# Patient Record
Sex: FEMALE | Race: WHITE | NOT HISPANIC OR LATINO | Employment: STUDENT | URBAN - METROPOLITAN AREA
[De-identification: names, ages, dates, MRNs, and addresses within clinical notes are randomized per-mention and may not be internally consistent; named-entity substitution may affect disease eponyms.]

---

## 2020-01-01 ENCOUNTER — IMMUNIZATION (OUTPATIENT)
Dept: VACCINE CLINIC | Facility: HOSPITAL | Age: 20
End: 2020-01-01

## 2020-01-01 PROCEDURE — 0011A: CPT | Performed by: OBSTETRICS & GYNECOLOGY

## 2020-01-01 PROCEDURE — 91301 HC SARSCO02 VAC 100MCG/0.5ML IM: CPT | Performed by: OBSTETRICS & GYNECOLOGY

## 2020-01-07 ENCOUNTER — APPOINTMENT (OUTPATIENT)
Dept: GENERAL RADIOLOGY | Facility: HOSPITAL | Age: 20
End: 2020-01-07

## 2020-01-07 ENCOUNTER — HOSPITAL ENCOUNTER (OUTPATIENT)
Facility: HOSPITAL | Age: 20
Setting detail: OBSERVATION
Discharge: HOME | End: 2020-01-08
Attending: EMERGENCY MEDICINE | Admitting: HOSPITALIST

## 2020-01-07 ENCOUNTER — APPOINTMENT (OUTPATIENT)
Dept: CT IMAGING | Facility: HOSPITAL | Age: 20
End: 2020-01-07

## 2020-01-07 DIAGNOSIS — J10.1 INFLUENZA DUE TO INFLUENZA VIRUS, TYPE B: ICD-10-CM

## 2020-01-07 DIAGNOSIS — R65.20 SEPSIS WITH ENCEPHALOPATHY WITHOUT SEPTIC SHOCK, DUE TO UNSPECIFIED ORGANISM (HCC): ICD-10-CM

## 2020-01-07 DIAGNOSIS — E86.0 DEHYDRATION: ICD-10-CM

## 2020-01-07 DIAGNOSIS — G93.40 SEPSIS WITH ENCEPHALOPATHY WITHOUT SEPTIC SHOCK, DUE TO UNSPECIFIED ORGANISM (HCC): ICD-10-CM

## 2020-01-07 DIAGNOSIS — J18.9 PNEUMONIA OF RIGHT LOWER LOBE DUE TO INFECTIOUS ORGANISM: Primary | ICD-10-CM

## 2020-01-07 DIAGNOSIS — A08.4 VIRAL GASTROENTERITIS: ICD-10-CM

## 2020-01-07 DIAGNOSIS — A41.9 SEPSIS WITH ENCEPHALOPATHY WITHOUT SEPTIC SHOCK, DUE TO UNSPECIFIED ORGANISM (HCC): ICD-10-CM

## 2020-01-07 LAB
ALBUMIN SERPL-MCNC: 4.2 G/DL (ref 3.5–5.2)
ALBUMIN/GLOB SERPL: 1.2 G/DL
ALP SERPL-CCNC: 98 U/L (ref 39–117)
ALT SERPL W P-5'-P-CCNC: 28 U/L (ref 1–33)
ANION GAP SERPL CALCULATED.3IONS-SCNC: 20.2 MMOL/L (ref 5–15)
AST SERPL-CCNC: 27 U/L (ref 1–32)
B PARAPERT DNA SPEC QL NAA+PROBE: NOT DETECTED
B PERT DNA SPEC QL NAA+PROBE: NOT DETECTED
BACTERIA UR QL AUTO: ABNORMAL /HPF
BASOPHILS # BLD AUTO: 0.02 10*3/MM3 (ref 0–0.2)
BASOPHILS NFR BLD AUTO: 0.1 % (ref 0–1.5)
BILIRUB SERPL-MCNC: 0.4 MG/DL (ref 0.2–1.2)
BILIRUB UR QL STRIP: ABNORMAL
BUN BLD-MCNC: 10 MG/DL (ref 6–20)
BUN/CREAT SERPL: 22.2 (ref 7–25)
C PNEUM DNA NPH QL NAA+NON-PROBE: NOT DETECTED
CALCIUM SPEC-SCNC: 8.9 MG/DL (ref 8.6–10.5)
CHLORIDE SERPL-SCNC: 93 MMOL/L (ref 98–107)
CLARITY UR: CLEAR
CO2 SERPL-SCNC: 20.8 MMOL/L (ref 22–29)
COLOR UR: YELLOW
CREAT BLD-MCNC: 0.45 MG/DL (ref 0.57–1)
D-LACTATE SERPL-SCNC: 0.8 MMOL/L (ref 0.5–2)
D-LACTATE SERPL-SCNC: 2.1 MMOL/L (ref 0.5–2)
DEPRECATED RDW RBC AUTO: 37.7 FL (ref 37–54)
EOSINOPHIL # BLD AUTO: 0 10*3/MM3 (ref 0–0.4)
EOSINOPHIL NFR BLD AUTO: 0 % (ref 0.3–6.2)
ERYTHROCYTE [DISTWIDTH] IN BLOOD BY AUTOMATED COUNT: 12.1 % (ref 12.3–15.4)
FLUAV AG NPH QL: NEGATIVE
FLUAV H1 2009 PAND RNA NPH QL NAA+PROBE: NOT DETECTED
FLUAV H1 HA GENE NPH QL NAA+PROBE: NOT DETECTED
FLUAV H3 RNA NPH QL NAA+PROBE: NOT DETECTED
FLUAV SUBTYP SPEC NAA+PROBE: NOT DETECTED
FLUBV AG NPH QL IA: POSITIVE
FLUBV RNA ISLT QL NAA+PROBE: DETECTED
GFR SERPL CREATININE-BSD FRML MDRD: >150 ML/MIN/1.73
GLOBULIN UR ELPH-MCNC: 3.6 GM/DL
GLUCOSE BLD-MCNC: 146 MG/DL (ref 65–99)
GLUCOSE UR STRIP-MCNC: NEGATIVE MG/DL
HADV DNA SPEC NAA+PROBE: NOT DETECTED
HCG SERPL QL: NEGATIVE
HCOV 229E RNA SPEC QL NAA+PROBE: NOT DETECTED
HCOV HKU1 RNA SPEC QL NAA+PROBE: NOT DETECTED
HCOV NL63 RNA SPEC QL NAA+PROBE: NOT DETECTED
HCOV OC43 RNA SPEC QL NAA+PROBE: NOT DETECTED
HCT VFR BLD AUTO: 38.3 % (ref 34–46.6)
HETEROPH AB SER QL LA: NEGATIVE
HGB BLD-MCNC: 13 G/DL (ref 12–15.9)
HGB UR QL STRIP.AUTO: ABNORMAL
HMPV RNA NPH QL NAA+NON-PROBE: NOT DETECTED
HOLD SPECIMEN: NORMAL
HPIV1 RNA SPEC QL NAA+PROBE: NOT DETECTED
HPIV2 RNA SPEC QL NAA+PROBE: NOT DETECTED
HPIV3 RNA NPH QL NAA+PROBE: NOT DETECTED
HPIV4 P GENE NPH QL NAA+PROBE: NOT DETECTED
HYALINE CASTS UR QL AUTO: ABNORMAL /LPF
IMM GRANULOCYTES # BLD AUTO: 0.1 10*3/MM3 (ref 0–0.05)
IMM GRANULOCYTES NFR BLD AUTO: 0.6 % (ref 0–0.5)
KETONES UR QL STRIP: ABNORMAL
LEUKOCYTE ESTERASE UR QL STRIP.AUTO: NEGATIVE
LIPASE SERPL-CCNC: 11 U/L (ref 13–60)
LYMPHOCYTES # BLD AUTO: 0.68 10*3/MM3 (ref 0.7–3.1)
LYMPHOCYTES NFR BLD AUTO: 4.1 % (ref 19.6–45.3)
M PNEUMO IGG SER IA-ACNC: NOT DETECTED
MCH RBC QN AUTO: 29 PG (ref 26.6–33)
MCHC RBC AUTO-ENTMCNC: 33.9 G/DL (ref 31.5–35.7)
MCV RBC AUTO: 85.3 FL (ref 79–97)
MONOCYTES # BLD AUTO: 0.82 10*3/MM3 (ref 0.1–0.9)
MONOCYTES NFR BLD AUTO: 5 % (ref 5–12)
NEUTROPHILS # BLD AUTO: 14.89 10*3/MM3 (ref 1.7–7)
NEUTROPHILS NFR BLD AUTO: 90.2 % (ref 42.7–76)
NITRITE UR QL STRIP: NEGATIVE
NRBC BLD AUTO-RTO: 0 /100 WBC (ref 0–0.2)
PH UR STRIP.AUTO: 6 [PH] (ref 4.5–8)
PLATELET # BLD AUTO: 150 10*3/MM3 (ref 140–450)
PMV BLD AUTO: 10.4 FL (ref 6–12)
POTASSIUM BLD-SCNC: 3.9 MMOL/L (ref 3.5–5.2)
PROCALCITONIN SERPL-MCNC: 0.45 NG/ML (ref 0.1–0.25)
PROT SERPL-MCNC: 7.8 G/DL (ref 6–8.5)
PROT UR QL STRIP: ABNORMAL
RBC # BLD AUTO: 4.49 10*6/MM3 (ref 3.77–5.28)
RBC # UR: ABNORMAL /HPF
REF LAB TEST METHOD: ABNORMAL
RHINOVIRUS RNA SPEC NAA+PROBE: NOT DETECTED
RSV RNA NPH QL NAA+NON-PROBE: NOT DETECTED
S PYO AG THROAT QL: NEGATIVE
SODIUM BLD-SCNC: 134 MMOL/L (ref 136–145)
SP GR UR STRIP: 1.03 (ref 1–1.03)
SQUAMOUS #/AREA URNS HPF: ABNORMAL /HPF
UROBILINOGEN UR QL STRIP: ABNORMAL
WBC NRBC COR # BLD: 16.51 10*3/MM3 (ref 3.4–10.8)
WBC UR QL AUTO: ABNORMAL /HPF

## 2020-01-07 PROCEDURE — 70450 CT HEAD/BRAIN W/O DYE: CPT

## 2020-01-07 PROCEDURE — 25010000002 AZITHROMYCIN PER 500 MG

## 2020-01-07 PROCEDURE — 86308 HETEROPHILE ANTIBODY SCREEN: CPT | Performed by: EMERGENCY MEDICINE

## 2020-01-07 PROCEDURE — 83690 ASSAY OF LIPASE: CPT | Performed by: EMERGENCY MEDICINE

## 2020-01-07 PROCEDURE — 84145 PROCALCITONIN (PCT): CPT | Performed by: EMERGENCY MEDICINE

## 2020-01-07 PROCEDURE — 96375 TX/PRO/DX INJ NEW DRUG ADDON: CPT

## 2020-01-07 PROCEDURE — 81001 URINALYSIS AUTO W/SCOPE: CPT | Performed by: EMERGENCY MEDICINE

## 2020-01-07 PROCEDURE — 96361 HYDRATE IV INFUSION ADD-ON: CPT

## 2020-01-07 PROCEDURE — 87804 INFLUENZA ASSAY W/OPTIC: CPT | Performed by: EMERGENCY MEDICINE

## 2020-01-07 PROCEDURE — G0378 HOSPITAL OBSERVATION PER HR: HCPCS

## 2020-01-07 PROCEDURE — 36415 COLL VENOUS BLD VENIPUNCTURE: CPT

## 2020-01-07 PROCEDURE — 83605 ASSAY OF LACTIC ACID: CPT | Performed by: FAMILY MEDICINE

## 2020-01-07 PROCEDURE — 84703 CHORIONIC GONADOTROPIN ASSAY: CPT | Performed by: EMERGENCY MEDICINE

## 2020-01-07 PROCEDURE — 0100U HC BIOFIRE FILMARRAY RESP PANEL 2: CPT | Performed by: EMERGENCY MEDICINE

## 2020-01-07 PROCEDURE — 87880 STREP A ASSAY W/OPTIC: CPT | Performed by: EMERGENCY MEDICINE

## 2020-01-07 PROCEDURE — 85025 COMPLETE CBC W/AUTO DIFF WBC: CPT | Performed by: EMERGENCY MEDICINE

## 2020-01-07 PROCEDURE — 87081 CULTURE SCREEN ONLY: CPT | Performed by: EMERGENCY MEDICINE

## 2020-01-07 PROCEDURE — 99284 EMERGENCY DEPT VISIT MOD MDM: CPT

## 2020-01-07 PROCEDURE — 71046 X-RAY EXAM CHEST 2 VIEWS: CPT

## 2020-01-07 PROCEDURE — 25010000002 ONDANSETRON PER 1 MG: Performed by: EMERGENCY MEDICINE

## 2020-01-07 PROCEDURE — 80053 COMPREHEN METABOLIC PANEL: CPT | Performed by: EMERGENCY MEDICINE

## 2020-01-07 PROCEDURE — 25010000002 CEFTRIAXONE SODIUM-DEXTROSE 2-2.22 GM-%(50ML) RECONSTITUTED SOLUTION: Performed by: EMERGENCY MEDICINE

## 2020-01-07 PROCEDURE — 99284 EMERGENCY DEPT VISIT MOD MDM: CPT | Performed by: EMERGENCY MEDICINE

## 2020-01-07 PROCEDURE — 96365 THER/PROPH/DIAG IV INF INIT: CPT

## 2020-01-07 PROCEDURE — 87040 BLOOD CULTURE FOR BACTERIA: CPT | Performed by: EMERGENCY MEDICINE

## 2020-01-07 PROCEDURE — 83605 ASSAY OF LACTIC ACID: CPT | Performed by: EMERGENCY MEDICINE

## 2020-01-07 PROCEDURE — 99220 PR INITIAL OBSERVATION CARE/DAY 70 MINUTES: CPT | Performed by: FAMILY MEDICINE

## 2020-01-07 RX ORDER — SODIUM CHLORIDE 9 MG/ML
40 INJECTION, SOLUTION INTRAVENOUS AS NEEDED
Status: DISCONTINUED | OUTPATIENT
Start: 2020-01-07 | End: 2020-01-08 | Stop reason: HOSPADM

## 2020-01-07 RX ORDER — AZITHROMYCIN 500 MG/1
INJECTION, POWDER, LYOPHILIZED, FOR SOLUTION INTRAVENOUS
Status: COMPLETED
Start: 2020-01-07 | End: 2020-01-08

## 2020-01-07 RX ORDER — SODIUM CHLORIDE 0.9 % (FLUSH) 0.9 %
10 SYRINGE (ML) INJECTION AS NEEDED
Status: DISCONTINUED | OUTPATIENT
Start: 2020-01-07 | End: 2020-01-08 | Stop reason: HOSPADM

## 2020-01-07 RX ORDER — ONDANSETRON 2 MG/ML
8 INJECTION INTRAMUSCULAR; INTRAVENOUS ONCE
Status: COMPLETED | OUTPATIENT
Start: 2020-01-07 | End: 2020-01-07

## 2020-01-07 RX ORDER — SODIUM CHLORIDE 0.9 % (FLUSH) 0.9 %
10 SYRINGE (ML) INJECTION EVERY 12 HOURS SCHEDULED
Status: DISCONTINUED | OUTPATIENT
Start: 2020-01-07 | End: 2020-01-08 | Stop reason: HOSPADM

## 2020-01-07 RX ORDER — CEFTRIAXONE 2 G/50ML
2 INJECTION, SOLUTION INTRAVENOUS ONCE
Status: COMPLETED | OUTPATIENT
Start: 2020-01-07 | End: 2020-01-07

## 2020-01-07 RX ORDER — SODIUM CHLORIDE 9 MG/ML
INJECTION, SOLUTION INTRAVENOUS
Status: DISPENSED
Start: 2020-01-07 | End: 2020-01-08

## 2020-01-07 RX ORDER — SODIUM CHLORIDE, SODIUM LACTATE, POTASSIUM CHLORIDE, CALCIUM CHLORIDE 600; 310; 30; 20 MG/100ML; MG/100ML; MG/100ML; MG/100ML
250 INJECTION, SOLUTION INTRAVENOUS CONTINUOUS
Status: DISCONTINUED | OUTPATIENT
Start: 2020-01-07 | End: 2020-01-08 | Stop reason: HOSPADM

## 2020-01-07 RX ORDER — LACOSAMIDE 50 MG/1
150 TABLET ORAL EVERY 12 HOURS SCHEDULED
Status: DISCONTINUED | OUTPATIENT
Start: 2020-01-07 | End: 2020-01-08 | Stop reason: HOSPADM

## 2020-01-07 RX ORDER — OSELTAMIVIR PHOSPHATE 75 MG/1
75 CAPSULE ORAL EVERY 12 HOURS SCHEDULED
Status: DISCONTINUED | OUTPATIENT
Start: 2020-01-07 | End: 2020-01-08 | Stop reason: HOSPADM

## 2020-01-07 RX ORDER — SODIUM CHLORIDE 9 MG/ML
250 INJECTION, SOLUTION INTRAVENOUS CONTINUOUS
Status: DISCONTINUED | OUTPATIENT
Start: 2020-01-07 | End: 2020-01-08 | Stop reason: HOSPADM

## 2020-01-07 RX ORDER — LACOSAMIDE 150 MG/1
250 TABLET ORAL 2 TIMES DAILY
COMMUNITY

## 2020-01-07 RX ORDER — LEVETIRACETAM 500 MG/1
1000 TABLET ORAL 2 TIMES DAILY
COMMUNITY

## 2020-01-07 RX ORDER — LEVETIRACETAM 500 MG/1
1000 TABLET ORAL 2 TIMES DAILY
Status: DISCONTINUED | OUTPATIENT
Start: 2020-01-07 | End: 2020-01-08 | Stop reason: HOSPADM

## 2020-01-07 RX ADMIN — AZITHROMYCIN MONOHYDRATE 500 MG: 500 INJECTION, POWDER, LYOPHILIZED, FOR SOLUTION INTRAVENOUS at 20:47

## 2020-01-07 RX ADMIN — SODIUM CHLORIDE, PRESERVATIVE FREE 10 ML: 5 INJECTION INTRAVENOUS at 20:48

## 2020-01-07 RX ADMIN — SODIUM CHLORIDE 1000 ML: 9 INJECTION, SOLUTION INTRAVENOUS at 16:50

## 2020-01-07 RX ADMIN — LEVETIRACETAM 1000 MG: 500 TABLET ORAL at 22:11

## 2020-01-07 RX ADMIN — OSELTAMIVIR PHOSPHATE 75 MG: 75 CAPSULE ORAL at 22:11

## 2020-01-07 RX ADMIN — CEFTRIAXONE 2 G: 2 INJECTION, SOLUTION INTRAVENOUS at 18:03

## 2020-01-07 RX ADMIN — SODIUM CHLORIDE 250 ML/HR: 9 INJECTION, SOLUTION INTRAVENOUS at 17:55

## 2020-01-07 RX ADMIN — ONDANSETRON 8 MG: 2 INJECTION, SOLUTION INTRAMUSCULAR; INTRAVENOUS at 16:51

## 2020-01-07 RX ADMIN — SODIUM CHLORIDE, POTASSIUM CHLORIDE, SODIUM LACTATE AND CALCIUM CHLORIDE 100 ML/HR: 600; 310; 30; 20 INJECTION, SOLUTION INTRAVENOUS at 22:14

## 2020-01-07 RX ADMIN — LACOSAMIDE 150 MG: 50 TABLET, FILM COATED ORAL at 22:11

## 2020-01-07 NOTE — ED NOTES
Patient states nausea has improved, pt appears more alert, states still has double vision     Shakila Martino, RN  01/07/20 3510

## 2020-01-08 VITALS
RESPIRATION RATE: 18 BRPM | DIASTOLIC BLOOD PRESSURE: 66 MMHG | TEMPERATURE: 97.8 F | HEART RATE: 91 BPM | OXYGEN SATURATION: 98 % | HEIGHT: 66 IN | SYSTOLIC BLOOD PRESSURE: 89 MMHG | WEIGHT: 96.34 LBS | BODY MASS INDEX: 15.48 KG/M2

## 2020-01-08 LAB
ALBUMIN SERPL-MCNC: 3.1 G/DL (ref 3.5–5.2)
ALBUMIN/GLOB SERPL: 1.2 G/DL
ALP SERPL-CCNC: 68 U/L (ref 39–117)
ALT SERPL W P-5'-P-CCNC: 22 U/L (ref 1–33)
ANION GAP SERPL CALCULATED.3IONS-SCNC: 12 MMOL/L (ref 5–15)
AST SERPL-CCNC: 22 U/L (ref 1–32)
BASOPHILS # BLD AUTO: 0.01 10*3/MM3 (ref 0–0.2)
BASOPHILS NFR BLD AUTO: 0.1 % (ref 0–1.5)
BILIRUB SERPL-MCNC: 0.2 MG/DL (ref 0.2–1.2)
BUN BLD-MCNC: 6 MG/DL (ref 6–20)
BUN/CREAT SERPL: 15.8 (ref 7–25)
CALCIUM SPEC-SCNC: 8 MG/DL (ref 8.6–10.5)
CHLORIDE SERPL-SCNC: 102 MMOL/L (ref 98–107)
CO2 SERPL-SCNC: 24 MMOL/L (ref 22–29)
CREAT BLD-MCNC: 0.38 MG/DL (ref 0.57–1)
D-LACTATE SERPL-SCNC: 0.9 MMOL/L (ref 0.5–2)
DEPRECATED RDW RBC AUTO: 39 FL (ref 37–54)
EOSINOPHIL # BLD AUTO: 0 10*3/MM3 (ref 0–0.4)
EOSINOPHIL NFR BLD AUTO: 0 % (ref 0.3–6.2)
ERYTHROCYTE [DISTWIDTH] IN BLOOD BY AUTOMATED COUNT: 12.2 % (ref 12.3–15.4)
GFR SERPL CREATININE-BSD FRML MDRD: >150 ML/MIN/1.73
GLOBULIN UR ELPH-MCNC: 2.6 GM/DL
GLUCOSE BLD-MCNC: 84 MG/DL (ref 65–99)
HCT VFR BLD AUTO: 31.3 % (ref 34–46.6)
HGB BLD-MCNC: 10.3 G/DL (ref 12–15.9)
IMM GRANULOCYTES # BLD AUTO: 0.04 10*3/MM3 (ref 0–0.05)
IMM GRANULOCYTES NFR BLD AUTO: 0.4 % (ref 0–0.5)
LYMPHOCYTES # BLD AUTO: 1.84 10*3/MM3 (ref 0.7–3.1)
LYMPHOCYTES NFR BLD AUTO: 18.3 % (ref 19.6–45.3)
MCH RBC QN AUTO: 28.7 PG (ref 26.6–33)
MCHC RBC AUTO-ENTMCNC: 32.9 G/DL (ref 31.5–35.7)
MCV RBC AUTO: 87.2 FL (ref 79–97)
MONOCYTES # BLD AUTO: 0.84 10*3/MM3 (ref 0.1–0.9)
MONOCYTES NFR BLD AUTO: 8.4 % (ref 5–12)
NEUTROPHILS # BLD AUTO: 7.3 10*3/MM3 (ref 1.7–7)
NEUTROPHILS NFR BLD AUTO: 72.8 % (ref 42.7–76)
NRBC BLD AUTO-RTO: 0 /100 WBC (ref 0–0.2)
PLATELET # BLD AUTO: 122 10*3/MM3 (ref 140–450)
PMV BLD AUTO: 10.6 FL (ref 6–12)
POTASSIUM BLD-SCNC: 4 MMOL/L (ref 3.5–5.2)
PROCALCITONIN SERPL-MCNC: 0.39 NG/ML (ref 0.1–0.25)
PROT SERPL-MCNC: 5.7 G/DL (ref 6–8.5)
RBC # BLD AUTO: 3.59 10*6/MM3 (ref 3.77–5.28)
SODIUM BLD-SCNC: 138 MMOL/L (ref 136–145)
WBC NRBC COR # BLD: 10.03 10*3/MM3 (ref 3.4–10.8)

## 2020-01-08 PROCEDURE — 99217 PR OBSERVATION CARE DISCHARGE MANAGEMENT: CPT | Performed by: HOSPITALIST

## 2020-01-08 PROCEDURE — 84145 PROCALCITONIN (PCT): CPT | Performed by: FAMILY MEDICINE

## 2020-01-08 PROCEDURE — 80053 COMPREHEN METABOLIC PANEL: CPT | Performed by: FAMILY MEDICINE

## 2020-01-08 PROCEDURE — 83605 ASSAY OF LACTIC ACID: CPT | Performed by: FAMILY MEDICINE

## 2020-01-08 PROCEDURE — 85025 COMPLETE CBC W/AUTO DIFF WBC: CPT | Performed by: FAMILY MEDICINE

## 2020-01-08 PROCEDURE — 96361 HYDRATE IV INFUSION ADD-ON: CPT

## 2020-01-08 PROCEDURE — 94799 UNLISTED PULMONARY SVC/PX: CPT

## 2020-01-08 PROCEDURE — G0378 HOSPITAL OBSERVATION PER HR: HCPCS

## 2020-01-08 RX ORDER — CEFTRIAXONE 2 G/50ML
2 INJECTION, SOLUTION INTRAVENOUS EVERY 24 HOURS
Status: DISCONTINUED | OUTPATIENT
Start: 2020-01-08 | End: 2020-01-08 | Stop reason: HOSPADM

## 2020-01-08 RX ORDER — OSELTAMIVIR PHOSPHATE 75 MG/1
75 CAPSULE ORAL 2 TIMES DAILY
Qty: 10 CAPSULE | Refills: 0 | Status: SHIPPED | OUTPATIENT
Start: 2020-01-08 | End: 2020-01-13

## 2020-01-08 RX ORDER — CEFDINIR 300 MG/1
300 CAPSULE ORAL 2 TIMES DAILY
Qty: 8 CAPSULE | Refills: 0 | Status: SHIPPED | OUTPATIENT
Start: 2020-01-08 | End: 2020-01-12

## 2020-01-08 RX ORDER — DOXYCYCLINE HYCLATE 50 MG/1
100 CAPSULE ORAL 2 TIMES DAILY
Qty: 16 CAPSULE | Refills: 0 | Status: SHIPPED | OUTPATIENT
Start: 2020-01-08 | End: 2020-01-12

## 2020-01-08 RX ADMIN — LACOSAMIDE 150 MG: 50 TABLET, FILM COATED ORAL at 08:30

## 2020-01-08 RX ADMIN — SODIUM CHLORIDE, POTASSIUM CHLORIDE, SODIUM LACTATE AND CALCIUM CHLORIDE 250 ML/HR: 600; 310; 30; 20 INJECTION, SOLUTION INTRAVENOUS at 08:31

## 2020-01-08 RX ADMIN — OSELTAMIVIR PHOSPHATE 75 MG: 75 CAPSULE ORAL at 08:31

## 2020-01-08 RX ADMIN — SODIUM CHLORIDE, POTASSIUM CHLORIDE, SODIUM LACTATE AND CALCIUM CHLORIDE 250 ML/HR: 600; 310; 30; 20 INJECTION, SOLUTION INTRAVENOUS at 04:20

## 2020-01-08 RX ADMIN — LEVETIRACETAM 1000 MG: 500 TABLET ORAL at 08:31

## 2020-01-08 NOTE — NURSING NOTE
Case Management Discharge Note      Final Note: d/c home               Final Discharge Disposition Code: 01 - home or self-care

## 2020-01-08 NOTE — H&P
Patient Name:  Imelda Gonzales  YOB: 2000  MRN:  [id number]  Admit Date:  1/7/2020  Patient Care Team:  Pastora Munoz MD as PCP - General (Pediatrics)      Subjective   History Present Illness     Chief Complaint   Patient presents with   • Vomiting     Post tussive started sunday. two occurences today   • Diarrhea     started saturday. one occurence today   • Headache       Ms. Gonzales is a 19 y.o. female who presents to [facility] after suffering for 5 days with flulike symptoms.  Patient was taken to her pediatrician and was found to be negative for strep and flu.  Had increasing nausea, vomiting, and diarrhea.  Was unable to tolerate p.o.  Today she was using the restroom at her home when she became weak and fell hitting her head.  No loss of consciousness.  She was brought to the emergency department at that point.  In the emergency department she tested positive for flu B.  Chest x-ray was concerning for a possible right lower lobe pneumonia.  She was dehydrated on exam and also had large ketones in her urinalysis.  Sepsis screen was positive for tachycardia and elevated white count.  Lactic acid was elevated at 2.14.  Procalcitonin elevated at 0.45.  Was started on Rocephin and azithromycin.  CT scan of the head was negative.    Upon arriving to the floor and after receiving IV fluids patient has stabilized and improved.  She does have a history of seizures and takes Keppra and lacosamide.  No significant family history of illness.  No tobacco use.    History of Present Illness  Review of Systems   Constitutional: Positive for chills, fatigue and fever.   Gastrointestinal: Positive for abdominal pain, diarrhea, nausea and vomiting.   All other systems reviewed and are negative.       Personal History     Past Medical History:   Diagnosis Date   • Epilepsy (CMS/HCC)    • Seizures (CMS/HCC)      Past Surgical History:   Procedure Laterality Date   • ADENOIDECTOMY     •  TONSILLECTOMY       History reviewed. No pertinent family history.  Social History     Tobacco Use   • Smoking status: Never Smoker   Substance Use Topics   • Alcohol use: Never     Frequency: Never   • Drug use: Never     No current facility-administered medications on file prior to encounter.      Current Outpatient Medications on File Prior to Encounter   Medication Sig Dispense Refill   • Lacosamide 150 MG tablet Take 250 mg by mouth 2 (Two) Times a Day.     • levETIRAcetam (KEPPRA) 500 MG tablet Take 1,000 mg by mouth 2 (Two) Times a Day.       No Known Allergies    Objective    Objective     Vital Signs  Temp:  [97.2 °F (36.2 °C)-98.1 °F (36.7 °C)] 97.2 °F (36.2 °C)  Heart Rate:  [] 95  Resp:  [16-20] 16  BP: (108-128)/(71-84) 108/74  SpO2:  [95 %-99 %] 97 %  on   ;   Device (Oxygen Therapy): room air  Body mass index is 15.54 kg/m².    Physical Exam   Constitutional: She is oriented to person, place, and time. She appears well-developed and well-nourished. No distress.   Eyes: Pupils are equal, round, and reactive to light. EOM are normal.   Cardiovascular: Normal rate, regular rhythm, normal heart sounds and intact distal pulses.   No murmur heard.  Pulmonary/Chest: Effort normal and breath sounds normal. No respiratory distress. She has no wheezes. She has no rales.   Abdominal: Soft. Bowel sounds are normal. She exhibits no distension. There is no tenderness. There is no rebound and no guarding.   Neurological: She is alert and oriented to person, place, and time.   Skin: Skin is warm and dry. She is not diaphoretic.   Nursing note and vitals reviewed.      Results Review:  I reviewed the patient's new clinical results.  I reviewed the patient's new imaging results and agree with the interpretation.  I reviewed the patient's other test results and agree with the interpretation  I personally viewed and interpreted the patient's EKG/Telemetry data  Discussed with ED provider.    Lab Results (last 24  hours)     Procedure Component Value Units Date/Time    CBC & Differential [[id number]] Collected:  01/07/20 1642    Specimen:  Blood Updated:  01/07/20 1655    Narrative:       The following orders were created for panel order CBC & Differential.  Procedure                               Abnormality         Status                     ---------                               -----------         ------                     CBC Auto Differential[[id number]]        Abnormal            Final result                 Please view results for these tests on the individual orders.    Comprehensive Metabolic Panel [[id number]]  (Abnormal) Collected:  01/07/20 1642    Specimen:  Blood Updated:  01/07/20 1719     Glucose 146 mg/dL      BUN 10 mg/dL      Creatinine 0.45 mg/dL      Sodium 134 mmol/L      Potassium 3.9 mmol/L      Chloride 93 mmol/L      CO2 20.8 mmol/L      Calcium 8.9 mg/dL      Total Protein 7.8 g/dL      Albumin 4.20 g/dL      ALT (SGPT) 28 U/L      AST (SGOT) 27 U/L      Alkaline Phosphatase 98 U/L      Total Bilirubin 0.4 mg/dL      eGFR Non African Amer >150 mL/min/1.73      Globulin 3.6 gm/dL      A/G Ratio 1.2 g/dL      BUN/Creatinine Ratio 22.2     Anion Gap 20.2 mmol/L     Narrative:       GFR Normal >60  Chronic Kidney Disease <60  Kidney Failure <15      Lipase [[id number]]  (Abnormal) Collected:  01/07/20 1642    Specimen:  Blood Updated:  01/07/20 1719     Lipase 11 U/L     hCG, Serum, Qualitative [[id number]]  (Normal) Collected:  01/07/20 1642    Specimen:  Blood Updated:  01/07/20 1708     HCG Qualitative Negative    Procalcitonin [[id number]]  (Abnormal) Collected:  01/07/20 1642    Specimen:  Blood Updated:  01/07/20 1719     Procalcitonin 0.45 ng/mL     Lactic Acid, Plasma [[id number]]  (Abnormal) Collected:  01/07/20 1642    Specimen:  Blood Updated:  01/07/20 1713     Lactate 2.1 mmol/L     Mononucleosis Screen [[id number]]  (Normal) Collected:  01/07/20 1642    Specimen:  Blood Updated:  01/07/20 1708       Monospot Negative    CBC Auto Differential [[id number]]  (Abnormal) Collected:  01/07/20 1642    Specimen:  Blood Updated:  01/07/20 1655     WBC 16.51 10*3/mm3      RBC 4.49 10*6/mm3      Hemoglobin 13.0 g/dL      Hematocrit 38.3 %      MCV 85.3 fL      MCH 29.0 pg      MCHC 33.9 g/dL      RDW 12.1 %      RDW-SD 37.7 fl      MPV 10.4 fL      Platelets 150 10*3/mm3      Neutrophil % 90.2 %      Lymphocyte % 4.1 %      Monocyte % 5.0 %      Eosinophil % 0.0 %      Basophil % 0.1 %      Immature Grans % 0.6 %      Neutrophils, Absolute 14.89 10*3/mm3      Lymphocytes, Absolute 0.68 10*3/mm3      Monocytes, Absolute 0.82 10*3/mm3      Eosinophils, Absolute 0.00 10*3/mm3      Basophils, Absolute 0.02 10*3/mm3      Immature Grans, Absolute 0.10 10*3/mm3      nRBC 0.0 /100 WBC     Lactic Acid, Reflex Timer (This will reflex a repeat order 3-3:15 hours after ordered.) [[id number]] Collected:  01/07/20 1642    Specimen:  Blood Updated:  01/07/20 2015     Extra Tube Hold for add-ons.     Comment: Auto resulted.       Urinalysis With Microscopic If Indicated (No Culture) - Urine, Clean Catch [[id number]]  (Abnormal) Collected:  01/07/20 1644    Specimen:  Urine, Clean Catch Updated:  01/07/20 1706     Color, UA Yellow     Appearance, UA Clear     pH, UA 6.0     Specific [place], UA 1.030     Comment: Result obtained by Refractometer        Glucose, UA Negative     Ketones, UA >=160 mg/dL (4+)     Bilirubin, UA Small (1+)     Blood, UA Trace     Protein,  mg/dL (2+)     Leuk Esterase, UA Negative     Nitrite, UA Negative     Urobilinogen, UA 1.0 E.U./dL    Influenza Antigen, Rapid - Swab, Nasopharynx [[id number]]  (Abnormal) Collected:  01/07/20 1644    Specimen:  Swab from Nasopharynx Updated:  01/07/20 1709     Influenza A Ag, EIA Negative     Influenza B Ag, EIA Positive    Urinalysis, Microscopic Only - Urine, Clean Catch [[id number]]  (Abnormal) Collected:  01/07/20 1644    Specimen:  Urine, Clean Catch Updated:   01/07/20 1712     RBC, UA 0-2 /HPF      WBC, UA None Seen /HPF      Bacteria, UA None Seen /HPF      Squamous Epithelial Cells, UA 3-6 /HPF      Hyaline Casts, UA None Seen /LPF      Methodology Manual Light Microscopy    Rapid Strep A Screen - Swab, Throat [[id number]]  (Normal) Collected:  01/07/20 1652    Specimen:  Swab from Throat Updated:  01/07/20 1708     Strep A Ag Negative    Beta Strep Culture, Throat - Swab, Throat [[id number]] Collected:  01/07/20 1652    Specimen:  Swab from Throat Updated:  01/07/20 1708    Blood Culture - Blood, Arm, Left [[id number]] Collected:  01/07/20 1658    Specimen:  Blood from Arm, Left Updated:  01/07/20 1706    Blood Culture - Blood, Arm, Right [[id number]] Collected:  01/07/20 1747    Specimen:  Blood from Arm, Right Updated:  01/07/20 1822    Respiratory Panel, PCR - Swab, Nasopharynx [[id number]] Collected:  01/07/20 1825    Specimen:  Swab from Nasopharynx Updated:  01/07/20 1825    Lactic Acid, Reflex [[id number]]  (Normal) Collected:  01/07/20 2044    Specimen:  Blood Updated:  01/07/20 2102     Lactate 0.8 mmol/L           Imaging Results (Last 24 Hours)     Procedure Component Value Units Date/Time    CT Head Without Contrast [[id number]] Collected:  01/07/20 1736     Updated:  01/07/20 1909    Narrative:       CT Head WO    HISTORY:   Headache with altered mental status and blurred vision for 2 to 3 days.    TECHNIQUE:   Axial unenhanced head CT. Radiation dose reduction techniques included automated exposure control or exposure modulation based on body size. Count of known CT and cardiac nuc med studies performed in previous 12 months: 0.     Time of scan: 1727 hours    COMPARISON:   None.    FINDINGS:   No intracranial hemorrhage, mass, or infarct. No hydrocephalus or extra-axial fluid collection. Brain parenchymal density is normal. The skull base, calvarium, and extracranial soft tissues are normal.      Impression:       Normal, negative unenhanced head  CT.          Signer Name: DEJAH Fu MD   Signed: 1/7/2020 5:36 PM   Workstation Name: [facility]    [facility]    XR Chest 2 View [[id number]] Collected:  01/07/20 1740     Updated:  01/07/20 1909    Narrative:       CR Chest 2 Vws    INDICATION:    Cough and fever; tested positive for flu.    COMPARISON:    4/14/2016    FINDINGS:   PA and lateral views of the chest.  Bibasilar parenchymal opacities highly concerning for acute air space disease and pneumonia. Extensive parenchymal opacity noted posteriorly on the lateral view. No effusions. Heart and mediastinum unremarkable. Mid  and upper lung zones remain clear. Osseous structures appear normal.      Impression:       Bibasilar parenchymal opacities highly concerning for acute air space disease and pneumonia. Patient also with apparent pneumonia in April 2016. Pulmonary evaluation and follow-up may be warranted.    Signer Name: DEJAH Fu MD   Signed: 1/7/2020 5:40 PM   Workstation Name: [facility]    [facility]               No orders to display        Assessment/Plan     Active Hospital Problems    Diagnosis  POA   • Pneumonia of right lower lobe due to infectious organism (CMS/[facility]) [J18.1]  Yes      Resolved Hospital Problems   No resolved problems to display.       Ms. Gonzales is a 19 y.o.     1.  Sepsis secondary to influenza B and right lower lobe pneumonia.  Will start Tamiflu, azithromycin, and Rocephin.  Continue from ER.  Continue IV fluids for rehydration.  Will repeat lactic acid as well as procalcitonin accordingly.    2.  Right lower lobe pneumonia.  Azithromycin and Rocephin.    3.  Seizures.  Continue home medications.    · I discussed the patient's findings and my recommendations with patient, family and nursing staff.    VTE Prophylaxis - Not indicated.  Code Status - Full code.       Jose Ochoa MD  01/07/20  9:04 PM

## 2020-01-08 NOTE — DISCHARGE SUMMARY
Imelda Gonzales  2000  [id number]    Hospitalists Discharge Summary    Date of Admission: 1/7/2020  Date of Discharge:  1/8/2020    Primary Discharge Diagnoses:  1.  Influenza B  2.  CABP    Secondary Discharge Diagnoses:  1. Seizure Disorder    History of Present Illness (taken from H&P):  Ms. Gonzales is a 19 y.o. female who presents to [facility] after suffering for 5 days with flulike symptoms.  Patient was taken to her pediatrician and was found to be negative for strep and flu.  Had increasing nausea, vomiting, and diarrhea.  Was unable to tolerate p.o.  Today she was using the restroom at her home when she became weak and fell hitting her head.  No loss of consciousness.  She was brought to the emergency department at that point.  In the emergency department she tested positive for flu B.  Chest x-ray was concerning for a possible right lower lobe pneumonia.  She was dehydrated on exam and also had large ketones in her urinalysis.  Sepsis screen was positive for tachycardia and elevated white count.  Lactic acid was elevated at 2.14.  Procalcitonin elevated at 0.45.  Was started on Rocephin and azithromycin.  CT scan of the head was negative.     Upon arriving to the floor and after receiving IV fluids patient has stabilized and improved.  She does have a history of seizures and takes Keppra and lacosamide.  No significant family history of illness.  No tobacco use.    Hospital Course:  Ms. Gonzales was admitted to the Med/Surg unit.  Respiratory Viral Panel was positive for Influenza B.  Procalcitonin remained in the treatment range.  Her leukocytosis resolved overnight which I suspect was partly due to hemoconcentration.  I also changed her abx regimen to cover for Staph given the Influenza pathology.  She was taking in good PO on the day of discharge.    PCP  Patient Care Team:  Pastora Munoz MD as PCP - General (Pediatrics)    Consults:   Consults     No orders found for last 30  day(s).          Operations and Procedures Performed:       Xr Chest 2 View    Result Date: 1/7/2020  Narrative: CR Chest 2 Vws INDICATION:  Cough and fever; tested positive for flu. COMPARISON:  4/14/2016 FINDINGS: PA and lateral views of the chest.  Bibasilar parenchymal opacities highly concerning for acute air space disease and pneumonia. Extensive parenchymal opacity noted posteriorly on the lateral view. No effusions. Heart and mediastinum unremarkable. Mid and upper lung zones remain clear. Osseous structures appear normal.     Impression: Bibasilar parenchymal opacities highly concerning for acute air space disease and pneumonia. Patient also with apparent pneumonia in April 2016. Pulmonary evaluation and follow-up may be warranted. Signer Name: DEJAH Fu MD  Signed: 1/7/2020 5:40 PM  Workstation Name: [facility]  [facility]    Ct Head Without Contrast    Result Date: 1/7/2020  Narrative: CT Head WO HISTORY: Headache with altered mental status and blurred vision for 2 to 3 days. TECHNIQUE: Axial unenhanced head CT. Radiation dose reduction techniques included automated exposure control or exposure modulation based on body size. Count of known CT and cardiac nuc med studies performed in previous 12 months: 0. Time of scan: 1727 hours COMPARISON: None. FINDINGS: No intracranial hemorrhage, mass, or infarct. No hydrocephalus or extra-axial fluid collection. Brain parenchymal density is normal. The skull base, calvarium, and extracranial soft tissues are normal.     Impression: Normal, negative unenhanced head CT. Signer Name: DEJAH Fu MD  Signed: 1/7/2020 5:36 PM  Workstation Name: [facility]  [facility]      Allergies:  has No Known Allergies.    Seven  reviewed    Discharge Medications:     Discharge Medications      New Medications      Instructions Start Date   cefdinir 300 MG capsule  Commonly known as:  OMNICEF   300 mg, Oral, 2 Times  Daily      doxycycline 50 MG capsule  Commonly known as:  VIBRAMYCIN   100 mg, Oral, 2 Times Daily      oseltamivir 75 MG capsule  Commonly known as:  TAMIFLU   75 mg, Oral, 2 Times Daily         Continue These Medications      Instructions Start Date   Lacosamide 150 MG tablet   250 mg, Oral, 2 Times Daily      levETIRAcetam 500 MG tablet  Commonly known as:  KEPPRA   1,000 mg, Oral, 2 Times Daily             Last Lab Results:   Lab Results (most recent)     Procedure Component Value Units Date/Time    Beta Strep Culture, Throat - Swab, Throat [[id number]]  (Normal) Collected:  01/07/20 1652    Specimen:  Swab from Throat Updated:  01/08/20 1320     Throat Culture, Beta Strep No Beta Hemolytic Streptococcus Isolated    Narrative:       Group A Strep incidence is low in adults. Positive culture for Beta hemolytic Streptococcus species can reflect colonization and not true infection. Please correlate clinically.    Comprehensive Metabolic Panel [[id number]]  (Abnormal) Collected:  01/08/20 0328    Specimen:  Blood Updated:  01/08/20 0548     Glucose 84 mg/dL      BUN 6 mg/dL      Creatinine 0.38 mg/dL      Sodium 138 mmol/L      Potassium 4.0 mmol/L      Chloride 102 mmol/L      CO2 24.0 mmol/L      Calcium 8.0 mg/dL      Total Protein 5.7 g/dL      Albumin 3.10 g/dL      ALT (SGPT) 22 U/L      AST (SGOT) 22 U/L      Alkaline Phosphatase 68 U/L      Total Bilirubin 0.2 mg/dL      eGFR Non African Amer >150 mL/min/1.73      Globulin 2.6 gm/dL      A/G Ratio 1.2 g/dL      BUN/Creatinine Ratio 15.8     Anion Gap 12.0 mmol/L     Narrative:       GFR Normal >60  Chronic Kidney Disease <60  Kidney Failure <15      Procalcitonin [[id number]]  (Abnormal) Collected:  01/08/20 0328    Specimen:  Blood Updated:  01/08/20 0533     Procalcitonin 0.39 ng/mL     Lactic Acid, Plasma [[id number]]  (Normal) Collected:  01/08/20 0328    Specimen:  Blood Updated:  01/08/20 0529     Lactate 0.9 mmol/L     CBC & Differential [[id number]]  Collected:  01/08/20 0328    Specimen:  Blood Updated:  01/08/20 0504    Narrative:       The following orders were created for panel order CBC & Differential.  Procedure                               Abnormality         Status                     ---------                               -----------         ------                     CBC Auto Differential[[id number]]        Abnormal            Final result                 Please view results for these tests on the individual orders.    CBC Auto Differential [[id number]]  (Abnormal) Collected:  01/08/20 0328    Specimen:  Blood Updated:  01/08/20 0504     WBC 10.03 10*3/mm3      RBC 3.59 10*6/mm3      Hemoglobin 10.3 g/dL      Hematocrit 31.3 %      MCV 87.2 fL      MCH 28.7 pg      MCHC 32.9 g/dL      RDW 12.2 %      RDW-SD 39.0 fl      MPV 10.6 fL      Platelets 122 10*3/mm3      Neutrophil % 72.8 %      Lymphocyte % 18.3 %      Monocyte % 8.4 %      Eosinophil % 0.0 %      Basophil % 0.1 %      Immature Grans % 0.4 %      Neutrophils, Absolute 7.30 10*3/mm3      Lymphocytes, Absolute 1.84 10*3/mm3      Monocytes, Absolute 0.84 10*3/mm3      Eosinophils, Absolute 0.00 10*3/mm3      Basophils, Absolute 0.01 10*3/mm3      Immature Grans, Absolute 0.04 10*3/mm3      nRBC 0.0 /100 WBC     Respiratory Panel, PCR - Swab, Nasopharynx [[id number]]  (Abnormal) Collected:  01/07/20 1825    Specimen:  Swab from Nasopharynx Updated:  01/07/20 2250     ADENOVIRUS, PCR Not Detected     Coronavirus 229E Not Detected     Coronavirus HKU1 Not Detected     Coronavirus NL63 Not Detected     Coronavirus OC43 Not Detected     Human Metapneumovirus Not Detected     Human Rhinovirus/Enterovirus Not Detected     Influenza B PCR Detected     Parainfluenza Virus 1 Not Detected     Parainfluenza Virus 2 Not Detected     Parainfluenza Virus 3 Not Detected     Parainfluenza Virus 4 Not Detected     Bordetella pertussis pcr Not Detected     Influenza A H1 2009 PCR Not Detected     Chlamydophila  pneumoniae PCR Not Detected     Mycoplasma pneumo by PCR Not Detected     Influenza A PCR Not Detected     Influenza A H3 Not Detected     Influenza A H1 Not Detected     RSV, PCR Not Detected     Bordetella parapertussis PCR Not Detected    Lactic Acid, Reflex [[id number]]  (Normal) Collected:  01/07/20 2044    Specimen:  Blood Updated:  01/07/20 2102     Lactate 0.8 mmol/L     Lactic Acid, Reflex Timer (This will reflex a repeat order 3-3:15 hours after ordered.) [[id number]] Collected:  01/07/20 1642    Specimen:  Blood Updated:  01/07/20 2015     Extra Tube Hold for add-ons.     Comment: Auto resulted.       Blood Culture - Blood, Arm, Right [[id number]] Collected:  01/07/20 1747    Specimen:  Blood from Arm, Right Updated:  01/07/20 1822    Procalcitonin [[id number]]  (Abnormal) Collected:  01/07/20 1642    Specimen:  Blood Updated:  01/07/20 1719     Procalcitonin 0.45 ng/mL     Comprehensive Metabolic Panel [[id number]]  (Abnormal) Collected:  01/07/20 1642    Specimen:  Blood Updated:  01/07/20 1719     Glucose 146 mg/dL      BUN 10 mg/dL      Creatinine 0.45 mg/dL      Sodium 134 mmol/L      Potassium 3.9 mmol/L      Chloride 93 mmol/L      CO2 20.8 mmol/L      Calcium 8.9 mg/dL      Total Protein 7.8 g/dL      Albumin 4.20 g/dL      ALT (SGPT) 28 U/L      AST (SGOT) 27 U/L      Alkaline Phosphatase 98 U/L      Total Bilirubin 0.4 mg/dL      eGFR Non African Amer >150 mL/min/1.73      Globulin 3.6 gm/dL      A/G Ratio 1.2 g/dL      BUN/Creatinine Ratio 22.2     Anion Gap 20.2 mmol/L     Narrative:       GFR Normal >60  Chronic Kidney Disease <60  Kidney Failure <15      Lipase [[id number]]  (Abnormal) Collected:  01/07/20 1642    Specimen:  Blood Updated:  01/07/20 1719     Lipase 11 U/L     Lactic Acid, Plasma [[id number]]  (Abnormal) Collected:  01/07/20 1642    Specimen:  Blood Updated:  01/07/20 1713     Lactate 2.1 mmol/L     Urinalysis, Microscopic Only - Urine, Clean Catch [[id number]]  (Abnormal) Collected:   01/07/20 1644    Specimen:  Urine, Clean Catch Updated:  01/07/20 1712     RBC, UA 0-2 /HPF      WBC, UA None Seen /HPF      Bacteria, UA None Seen /HPF      Squamous Epithelial Cells, UA 3-6 /HPF      Hyaline Casts, UA None Seen /LPF      Methodology Manual Light Microscopy    Influenza Antigen, Rapid - Swab, Nasopharynx [[id number]]  (Abnormal) Collected:  01/07/20 1644    Specimen:  Swab from Nasopharynx Updated:  01/07/20 1709     Influenza A Ag, EIA Negative     Influenza B Ag, EIA Positive    Mononucleosis Screen [[id number]]  (Normal) Collected:  01/07/20 1642    Specimen:  Blood Updated:  01/07/20 1708     Monospot Negative    hCG, Serum, Qualitative [[id number]]  (Normal) Collected:  01/07/20 1642    Specimen:  Blood Updated:  01/07/20 1708     HCG Qualitative Negative    Rapid Strep A Screen - Swab, Throat [[id number]]  (Normal) Collected:  01/07/20 1652    Specimen:  Swab from Throat Updated:  01/07/20 1708     Strep A Ag Negative    Urinalysis With Microscopic If Indicated (No Culture) - Urine, Clean Catch [[id number]]  (Abnormal) Collected:  01/07/20 1644    Specimen:  Urine, Clean Catch Updated:  01/07/20 1706     Color, UA Yellow     Appearance, UA Clear     pH, UA 6.0     Specific [place], UA 1.030     Comment: Result obtained by Refractometer        Glucose, UA Negative     Ketones, UA >=160 mg/dL (4+)     Bilirubin, UA Small (1+)     Blood, UA Trace     Protein,  mg/dL (2+)     Leuk Esterase, UA Negative     Nitrite, UA Negative     Urobilinogen, UA 1.0 E.U./dL    Blood Culture - Blood, Arm, Left [[id number]] Collected:  01/07/20 1658    Specimen:  Blood from Arm, Left Updated:  01/07/20 1706    CBC & Differential [[id number]] Collected:  01/07/20 1642    Specimen:  Blood Updated:  01/07/20 1655    Narrative:       The following orders were created for panel order CBC & Differential.  Procedure                               Abnormality         Status                     ---------                                -----------         ------                     CBC Auto Differential[[id number]]        Abnormal            Final result                 Please view results for these tests on the individual orders.    CBC Auto Differential [[id number]]  (Abnormal) Collected:  01/07/20 1642    Specimen:  Blood Updated:  01/07/20 1655     WBC 16.51 10*3/mm3      RBC 4.49 10*6/mm3      Hemoglobin 13.0 g/dL      Hematocrit 38.3 %      MCV 85.3 fL      MCH 29.0 pg      MCHC 33.9 g/dL      RDW 12.1 %      RDW-SD 37.7 fl      MPV 10.4 fL      Platelets 150 10*3/mm3      Neutrophil % 90.2 %      Lymphocyte % 4.1 %      Monocyte % 5.0 %      Eosinophil % 0.0 %      Basophil % 0.1 %      Immature Grans % 0.6 %      Neutrophils, Absolute 14.89 10*3/mm3      Lymphocytes, Absolute 0.68 10*3/mm3      Monocytes, Absolute 0.82 10*3/mm3      Eosinophils, Absolute 0.00 10*3/mm3      Basophils, Absolute 0.02 10*3/mm3      Immature Grans, Absolute 0.10 10*3/mm3      nRBC 0.0 /100 WBC         Imaging Results (Most Recent)     Procedure Component Value Units Date/Time    CT Head Without Contrast [[id number]] Collected:  01/07/20 1736     Updated:  01/07/20 1909    Narrative:       CT Head WO    HISTORY:   Headache with altered mental status and blurred vision for 2 to 3 days.    TECHNIQUE:   Axial unenhanced head CT. Radiation dose reduction techniques included automated exposure control or exposure modulation based on body size. Count of known CT and cardiac nuc med studies performed in previous 12 months: 0.     Time of scan: 1727 hours    COMPARISON:   None.    FINDINGS:   No intracranial hemorrhage, mass, or infarct. No hydrocephalus or extra-axial fluid collection. Brain parenchymal density is normal. The skull base, calvarium, and extracranial soft tissues are normal.      Impression:       Normal, negative unenhanced head CT.          Signer Name: DEJAH Fu MD   Signed: 1/7/2020 5:36 PM   Workstation Name: [id number]     [facility]    XR Chest 2 View [[id number]] Collected:  01/07/20 1740     Updated:  01/07/20 1909    Narrative:       CR Chest 2 Vws    INDICATION:    Cough and fever; tested positive for flu.    COMPARISON:    4/14/2016    FINDINGS:   PA and lateral views of the chest.  Bibasilar parenchymal opacities highly concerning for acute air space disease and pneumonia. Extensive parenchymal opacity noted posteriorly on the lateral view. No effusions. Heart and mediastinum unremarkable. Mid  and upper lung zones remain clear. Osseous structures appear normal.      Impression:       Bibasilar parenchymal opacities highly concerning for acute air space disease and pneumonia. Patient also with apparent pneumonia in April 2016. Pulmonary evaluation and follow-up may be warranted.    Signer Name: DEJAH Fu MD   Signed: 1/7/2020 5:40 PM   Workstation Name: [email or web address]    [facility]          PROCEDURES      Condition on Discharge:  Stable    Physical Exam at Discharge  Vital Signs  Temp:  [97.2 °F (36.2 °C)-98.1 °F (36.7 °C)] 97.8 °F (36.6 °C)  Heart Rate:  [] 91  Resp:  [16-20] 18  BP: ()/(56-84) 89/66    Physical Exam:  Physical Exam   Constitutional: Patient appears well-developed and well-nourished and in no acute distress   Cardiovascular: Regular rate, regular rhythm, S1 normal and S2 normal.  Exam reveals no gallop and no friction rub.  No murmur heard.  Pulmonary/Chest: Lungs are clear to auscultation bilaterally. No respiratory distress. No wheezes. No rhonchi. No rales.   Abdominal: Soft. Bowel sounds are normal. There is no tenderness.   Musculoskeletal: Normal Muscle tone  Extremities: No edema. No asymmetric edema of the BLE.  Neurological: Cranial nerves II-XII are grossly intact with no focal deficits.    Discharge Disposition  Home    Visiting Nurse:    No     Home PT/OT:  No     Home Safety Evaluation:  No     DME  None    Discharge Diet:         Dietary Orders (From admission, onward)     Start     Ordered    01/07/20 2028  Diet Regular  Diet Effective Now     Question:  Diet Texture / Consistency  Answer:  Regular    01/07/20 2031                Activity at Discharge:  As tolerated    Follow-up Appointments  No future appointments.  Additional Instructions for the Follow-ups that You Need to Schedule     Discharge Follow-up with PCP   As directed       Currently Documented PCP:    Pastora Munoz MD    PCP Phone Number:    [phone]     Follow Up Details:  1 week               Test Results Pending at Discharge   Order Current Status    Blood Culture - Blood, Arm, Left In process    Blood Culture - Blood, Arm, Right In process    Beta Strep Culture, Throat - Swab, Throat Preliminary result           Alejo Pate MD  01/08/20  1:32 PM

## 2020-01-08 NOTE — ED PROVIDER NOTES
Subjective     History provided by:  Patient and parent (Mother)    History of Present Illness    · Chief complaint: Fever, nausea, vomiting, diarrhea, cough, headache    · Location: Upper respiratory and GI tract.  Headache in the forehead that spreads out across the front of the head.    · Quality/Severity: Severe nausea and vomiting not tolerating p.o.  Watery diarrhea.  Cough productive of green sputum.  Subjective fever.  Headache described as pressure and intermittent and currently gone.    · Timing/Onset: Symptoms started Thursday 5 days ago.    · Modifying Factors: P.o. exacerbates the nausea and vomiting.    · Associated symptoms: She denies a runny nose.  The patient mentation is not her normal self answering questions slowly.    · Narrative: The patient is a 19-year-old white female brought in by her mother for a 5-day history of fever, nausea, vomiting, diarrhea, and a cough and a headache.  The headache goes and comes and is centered in the forehead, and is currently gone.  She denies any posterior head or neck pain or stiffness.  The cough is productive of yellow sputum.  She is vomited numerous times and is not tolerating p.o.  She was weak and fell in the bathroom striking her head but did not lose consciousness.  The patient was seen 4 days ago on Friday at [facility] pediatrics where she had a negative rapid strep screen and a negative flu screen.  Her past medical history significant for complex migraines.    Review of Systems   Constitutional: Positive for activity change, appetite change, chills, fatigue and fever. Negative for diaphoresis.   HENT: Negative for congestion, dental problem, ear pain, hearing loss, mouth sores, postnasal drip, rhinorrhea, sinus pressure, sore throat and voice change.    Eyes: Negative for photophobia, pain, discharge, redness and visual disturbance.   Respiratory: Positive for cough. Negative for chest tightness, shortness of breath, wheezing and stridor.   "  Cardiovascular: Negative for chest pain, palpitations and leg swelling.   Gastrointestinal: Positive for diarrhea, nausea and vomiting. Negative for abdominal pain.   Genitourinary: Negative for difficulty urinating, dysuria, flank pain, frequency, hematuria, pelvic pain and urgency.   Musculoskeletal: Positive for myalgias. Negative for arthralgias, back pain, gait problem, joint swelling, neck pain and neck stiffness.   Skin: Negative for color change and rash.   Neurological: Positive for dizziness, light-headedness and headaches. Negative for tremors, seizures, syncope, facial asymmetry, speech difficulty, weakness and numbness.   Hematological: Negative for adenopathy.   Psychiatric/Behavioral: Positive for confusion. Negative for decreased concentration. The patient is not nervous/anxious.      Past Medical History:   Diagnosis Date   • Epilepsy (CMS/HCC)    • Seizures (CMS/HCC)      /74 (BP Location: Left arm, Patient Position: Lying)   Pulse 95   Temp 97.2 °F (36.2 °C) (Oral)   Resp 16   Ht 167.7 cm (66.02\")   Wt 43.7 kg (96 lb 5.5 oz)   LMP 12/31/2019 (Exact Date)   SpO2 97%   BMI 15.54 kg/m²     Past Medical History:   Diagnosis Date   • Epilepsy (CMS/HCC)    • Seizures (CMS/HCC)        No Known Allergies    Past Surgical History:   Procedure Laterality Date   • ADENOIDECTOMY     • TONSILLECTOMY         History reviewed. No pertinent family history.    Social History     Socioeconomic History   • Marital status: Single     Spouse name: Not on file   • Number of children: Not on file   • Years of education: Not on file   • Highest education level: Not on file   Tobacco Use   • Smoking status: Never Smoker   Substance and Sexual Activity   • Alcohol use: Never     Frequency: Never   • Drug use: Never   • Sexual activity: Never           Objective   Physical Exam   Constitutional: She appears well-developed and well-nourished. She appears distressed.   The patient appears acutely ill.  Review " of her vital signs: She is afebrile with a temperature 97.8, tachypnea with a respiratory rate of 20 with a normal oxygen saturation of 96% on room air, blood pressure normal 128/84, tachycardic with a heart rate of 114.   HENT:   Head: Normocephalic.   Right Ear: External ear normal.   Left Ear: External ear normal.   Nose: Nose normal.   The patient has a a quarter sized area of reddish ecchymosis on the center of the forehead consistent with a acute contusion.  There is no underlying bony deformity.  Mucous membranes are very dry and parched.   Eyes: Pupils are equal, round, and reactive to light. Conjunctivae and EOM are normal. No scleral icterus.   Neck: Normal range of motion. Neck supple.   Cardiovascular: Regular rhythm. Exam reveals no friction rub.   No murmur heard.  Heart sounds are regular and tachycardic with a rate of 115.   Pulmonary/Chest: Effort normal and breath sounds normal.   Abdominal: Soft. Bowel sounds are normal. She exhibits no distension and no mass. There is no tenderness. There is no guarding.   Musculoskeletal: Normal range of motion. She exhibits no edema or tenderness.   Lymphadenopathy:     She has no cervical adenopathy.   Neurological: She is alert. No cranial nerve deficit or sensory deficit. She exhibits normal muscle tone.   The patient is oriented to person and place and parents.  She is slow to answer questions.  Her mentation is consistent with acute delirium.   Skin: She is not diaphoretic.   Psychiatric:   The patient's mentation is confused and slow to answer.  Her mentation is consistent with acute delirium.   Nursing note and vitals reviewed.      Procedures           ED Course  ED Course as of Jan 07 1912   Tue Jan 07, 2020 1907 Review the patient's test results: Her influenza screen was positive for influenza B.  Rapid strep screen negative.  CBC had a markedly elevated white count of 16.5 with a left shift.  Hemoglobin, hematocrit and platelets were within normal  limits.  Her CMP had a slightly low sodium of 134, potassium normal at 3.9.  Blood glucose elevated 146, renal liver function test within normal limits.  CO2 low at 20.8 consistent with acidosis.  Her lactic acid level was mildly elevated at 2.1.  Procalcitonin level elevated at 0.45.  Urinalysis had a concentrated urine with specific gravity greater than 1.030 with 4+ ketones consistent with dehydration.  Urine was negative for infection.  Monospot was negative.  Her chest x-ray demonstrated a right lower lobe pneumonia to my interpretation.  The radiologist noted bibasilar infiltrates.  Her CT of the head was interpreted by me and the radiologist as normal.    [TP]   1910 The patient was administered a normal saline 1 L IV bolus and a second liter was infused at 250 cc/h.  She was administered Zofran 8 mg IV for nausea.  2 sets of blood cultures were obtained, and then the patient was ordered Rocephin 2 g and Zithromax 500 mg IV to initiate treatment for her pneumonia.    [TP]   1910 17:45 the patient discussed with Dr. Wong, hospitalist, who agreed to admit the patient to telemetry for further evaluation and treatment.    [TP]      ED Course User Index  [TP] Rupesh Samano MD                                               MDM  Number of Diagnoses or Management Options  Dehydration: new and requires workup  Influenza due to influenza virus, type B: new and requires workup  Pneumonia of right lower lobe due to infectious organism (CMS/HCC): new and requires workup  Sepsis with encephalopathy without septic shock, due to unspecified organism (CMS/HCC): new and requires workup  Viral gastroenteritis: new and requires workup     Amount and/or Complexity of Data Reviewed  Clinical lab tests: ordered and reviewed  Tests in the radiology section of CPT®: ordered and reviewed  Discuss the patient with other providers: yes  Independent visualization of images, tracings, or specimens: yes    Risk of Complications,  Morbidity, and/or Mortality  Presenting problems: high  Diagnostic procedures: high  Management options: high  General comments: My differential diagnosis for cough includes but is not limited to:  Upper respiratory infection, bronchitis, pneumonia, COPD exacerbation, cough variant asthma, cardiac asthma, coronary artery disease, congestive heart failure, bacterial, viral or lung infections, lung cancer, aspiration pneumonitis, aspiration of foreign body.   My differential diagnosis for altered mental status includes but is not limited to:  Hypoglycemia, hyperglycemia, DKA, overdose, ethanol intoxication, thiamine deficiency, niacin deficiency, hypothymia, hyperviscosity, Panda’s disease, hyponatremia, hypernatremia, liver failure, kidney failure, hyper or hypothyroid, no insufficiency, hypoxia, hypercarbia, carbon monoxide poisoning, postanoxic encephalopathy, ischemic stroke, intracranial bleed, subarachnoid hemorrhage, brain tumor, closed head injury, epidural hematoma, epidural hematoma, seizure activity, postictal state, syncopal episode, disseminated encephalomyelitis, central pontine myelinolysis, post cardiac arrest, bacterial meningitis, viral meningitis, fungal meningitis, encephalitis, brain abscess, subdural empyema, hysteria, catatonic state, malingering, hypertensive encephalopathy, vasculitis, TTP, DIC      Patient Progress  Patient progress: stable      Final diagnoses:   Pneumonia of right lower lobe due to infectious organism (CMS/HCC)   Influenza due to influenza virus, type B   Sepsis with encephalopathy without septic shock, due to unspecified organism (CMS/HCC)   Viral gastroenteritis   Dehydration           Labs Reviewed   INFLUENZA ANTIGEN, RAPID - Abnormal; Notable for the following components:       Result Value    Influenza B Ag, EIA Positive (*)     All other components within normal limits   COMPREHENSIVE METABOLIC PANEL - Abnormal; Notable for the following components:    Glucose 146  (*)     Creatinine 0.45 (*)     Sodium 134 (*)     Chloride 93 (*)     CO2 20.8 (*)     Anion Gap 20.2 (*)     All other components within normal limits    Narrative:     GFR Normal >60  Chronic Kidney Disease <60  Kidney Failure <15     LIPASE - Abnormal; Notable for the following components:    Lipase 11 (*)     All other components within normal limits   URINALYSIS W/ MICROSCOPIC IF INDICATED (NO CULTURE) - Abnormal; Notable for the following components:    Ketones, UA >=160 mg/dL (4+) (*)     Bilirubin, UA Small (1+) (*)     Blood, UA Trace (*)     Protein,  mg/dL (2+) (*)     All other components within normal limits   PROCALCITONIN - Abnormal; Notable for the following components:    Procalcitonin 0.45 (*)     All other components within normal limits   LACTIC ACID, PLASMA - Abnormal; Notable for the following components:    Lactate 2.1 (*)     All other components within normal limits   CBC WITH AUTO DIFFERENTIAL - Abnormal; Notable for the following components:    WBC 16.51 (*)     RDW 12.1 (*)     Neutrophil % 90.2 (*)     Lymphocyte % 4.1 (*)     Eosinophil % 0.0 (*)     Immature Grans % 0.6 (*)     Neutrophils, Absolute 14.89 (*)     Lymphocytes, Absolute 0.68 (*)     Immature Grans, Absolute 0.10 (*)     All other components within normal limits   URINALYSIS, MICROSCOPIC ONLY - Abnormal; Notable for the following components:    RBC, UA 0-2 (*)     Squamous Epithelial Cells, UA 3-6 (*)     All other components within normal limits   RAPID STREP A SCREEN - Normal   HCG, SERUM, QUALITATIVE - Normal   MONONUCLEOSIS SCREEN - Normal   BLOOD CULTURE   BLOOD CULTURE   BETA HEMOLYTIC STREP CULTURE, THROAT   RESPIRATORY PANEL, PCR   LACTIC ACID REFLEX TIMER   CBC AND DIFFERENTIAL    Narrative:     The following orders were created for panel order CBC & Differential.  Procedure                               Abnormality         Status                     ---------                               -----------          ------                     CBC Auto Differential[[id number]]        Abnormal            Final result                 Please view results for these tests on the individual orders.     XR Chest 2 View   ED Interpretation   Bibasilar parenchymal opacities highly concerning for acute air space disease and pneumonia. Patient also with apparent pneumonia in April 2016. Pulmonary evaluation and follow-up may be warranted.      Signer Name: DEJAH Fu MD    Signed: 1/7/2020 5:40 PM    Workstation Name: [facility]      Final Result   Bibasilar parenchymal opacities highly concerning for acute air space disease and pneumonia. Patient also with apparent pneumonia in April 2016. Pulmonary evaluation and follow-up may be warranted.      Signer Name: DEJAH Fu MD    Signed: 1/7/2020 5:40 PM    Workstation Name: [facility]      CT Head Without Contrast   ED Interpretation   Normal, negative unenhanced head CT.               Signer Name: DEJAH Fu MD    Signed: 1/7/2020 5:36 PM    Workstation Name: [facility]      Final Result   Normal, negative unenhanced head CT.               Signer Name: DEJAH Fu MD    Signed: 1/7/2020 5:36 PM    Workstation Name: [facility]             Medication List      No changes were made to your prescriptions during this visit.            Rupesh Samano MD  01/07/20 1912

## 2020-01-08 NOTE — PLAN OF CARE
Patient with flat affect, rested comfortably all night.  BP low, Dr. Ochoa notified and increased IVF to 250cc/hr which brought her pressure up.  Will continue to monitor.

## 2020-01-09 ENCOUNTER — READMISSION MANAGEMENT (OUTPATIENT)
Dept: CALL CENTER | Facility: HOSPITAL | Age: 20
End: 2020-01-09

## 2020-01-09 LAB — BACTERIA SPEC AEROBE CULT: NORMAL

## 2020-01-09 NOTE — OUTREACH NOTE
Prep Survey      Responses   Facility patient discharged from?  [facility]   Is LACE score < 7 ?  Yes   Is patient eligible?  Yes   Discharge diagnosis  Pneumonia of RLL d/t infectious organism, Sepsis d/t influenza B and RLL pneumonia, Seizures   Does the patient have one of the following disease processes/diagnoses(primary or secondary)?  Other [Sepsis LACE <7]   Does the patient have Home health ordered?  No   Is there a DME ordered?  No   Comments regarding appointments  Pt to schedule follow up with PCP   Prep survey completed?  Yes          Jennie Meléndez RN

## 2020-01-12 LAB
BACTERIA SPEC AEROBE CULT: NORMAL
BACTERIA SPEC AEROBE CULT: NORMAL

## 2020-01-13 ENCOUNTER — READMISSION MANAGEMENT (OUTPATIENT)
Dept: CALL CENTER | Facility: HOSPITAL | Age: 20
End: 2020-01-13

## 2020-01-13 NOTE — OUTREACH NOTE
LAG < 7 Survey      Responses   Facility patient discharged from?  [facility]   Does the patient have one of the following disease processes/diagnoses(primary or secondary)?  Other   Is there a successful TCM telephone encounter documented?  No   BHLAG <7 Attempt successful?  Yes   Call start time  1530   Call end time  1536   Discharge diagnosis  sepsis, influenza B, RLL pneumonia, seizures.   Is patient permission given to speak with other caregiver?  Yes   List who call center can speak with  Jillian Gonzales, mother   Person spoke with today (if not patient) and relationship  Mother   Meds reviewed with patient/caregiver?  Yes   Is the patient having any side effects they believe may be caused by any medication additions or changes?  No   Does the patient have all medications ordered at discharge?  Yes   Is the patient taking all medications as directed (includes completed medication regime)?  Yes   Does the patient have a primary care provider?   Yes   Does the patient have an appointment with their PCP within 7 days of discharge?  Yes   Comments regarding PCP  Pastora Munoz MD PCP.    Has the patient kept scheduled appointments due by today?  Yes   Has home health visited the patient within 72 hours of discharge?  N/A   Psychosocial issues?  No   Did the patient receive a copy of their discharge instructions?  Yes   Nursing interventions  Reviewed instructions with patient   What is the patient's perception of their health status since discharge?  Improving   Is the patient/caregiver able to teach back signs and symptoms related to disease process for when to call PCP?  Yes   Is the patient/caregiver able to teach back signs and symptoms related to disease process for when to call 911?  Yes   Is the patient/caregiver able to teach back the hierarchy of who to call/visit for symptoms/problems? PCP, Specialist, Home health nurse, Urgent Care, ED, 911  Yes   Graduated  Yes          Jennie Perkins RN

## 2021-01-01 ENCOUNTER — IMMUNIZATION (OUTPATIENT)
Dept: VACCINE CLINIC | Facility: HOSPITAL | Age: 21
End: 2021-01-01

## 2021-01-01 ENCOUNTER — HOSPITAL ENCOUNTER (EMERGENCY)
Facility: HOSPITAL | Age: 21
End: 2021-02-27
Attending: EMERGENCY MEDICINE | Admitting: EMERGENCY MEDICINE

## 2021-01-01 VITALS — OXYGEN SATURATION: 85 %

## 2021-01-01 DIAGNOSIS — I46.9 CARDIAC ARREST (HCC): Primary | ICD-10-CM

## 2021-01-01 PROCEDURE — 99284 EMERGENCY DEPT VISIT MOD MDM: CPT

## 2021-01-01 PROCEDURE — 91301 HC SARSCO02 VAC 100MCG/0.5ML IM: CPT | Performed by: OBSTETRICS & GYNECOLOGY

## 2021-01-01 PROCEDURE — 94799 UNLISTED PULMONARY SVC/PX: CPT

## 2021-01-01 PROCEDURE — 25010000002 EPINEPHRINE 1 MG/10ML SOLUTION PREFILLED SYRINGE: Performed by: EMERGENCY MEDICINE

## 2021-01-01 PROCEDURE — 92950 HEART/LUNG RESUSCITATION CPR: CPT

## 2021-01-01 PROCEDURE — 99285 EMERGENCY DEPT VISIT HI MDM: CPT | Performed by: EMERGENCY MEDICINE

## 2021-01-01 PROCEDURE — 0012A: CPT | Performed by: OBSTETRICS & GYNECOLOGY

## 2021-01-01 PROCEDURE — 31500 INSERT EMERGENCY AIRWAY: CPT

## 2021-01-01 PROCEDURE — 31500 INSERT EMERGENCY AIRWAY: CPT | Performed by: EMERGENCY MEDICINE

## 2021-01-01 RX ORDER — EPINEPHRINE 0.1 MG/ML
SYRINGE (ML) INJECTION
Status: COMPLETED | OUTPATIENT
Start: 2021-01-01 | End: 2021-01-01

## 2021-01-01 RX ADMIN — EPINEPHRINE 1 MG: 0.1 INJECTION INTRACARDIAC; INTRAVENOUS at 10:07

## 2021-01-01 RX ADMIN — EPINEPHRINE 1 MG: 0.1 INJECTION INTRACARDIAC; INTRAVENOUS at 10:04
